# Patient Record
Sex: FEMALE | Race: WHITE | NOT HISPANIC OR LATINO | Employment: OTHER | URBAN - METROPOLITAN AREA
[De-identification: names, ages, dates, MRNs, and addresses within clinical notes are randomized per-mention and may not be internally consistent; named-entity substitution may affect disease eponyms.]

---

## 2017-04-12 ENCOUNTER — IMPORTED ENCOUNTER (OUTPATIENT)
Dept: URBAN - METROPOLITAN AREA CLINIC 9 | Facility: CLINIC | Age: 42
End: 2017-04-12

## 2020-01-14 NOTE — PATIENT DISCUSSION
EXPLAINED TO PATIENT SHE IS NOT SEEING AS WELL AS SHE THINKS AS CATARACTS ARE A SLOW GRADUAL PROGRESSION

## 2020-01-14 NOTE — PATIENT DISCUSSION
CATARACT, OU - EQUALLY VISUALLY SIGNIFICANT . SCHEDULE SX OD (DOMINATE EYE) THEN LATER IN OS IF VISUAL SYMPTOMS PERSIST.

## 2020-03-03 NOTE — PATIENT DISCUSSION
S/P PC IOL, OU-  DOING WELL. CONTINUE DROPS AS DIRECTED. GLS RX GIVEN. RELEASED TO REFERRING PROVIDER. RTC PRN.

## 2021-10-01 ASSESSMENT — TONOMETRY
OD_IOP_MMHG: 13
OS_IOP_MMHG: 12

## 2021-10-22 ENCOUNTER — ESTABLISHED PATIENT (OUTPATIENT)
Dept: URBAN - METROPOLITAN AREA CLINIC 9 | Facility: CLINIC | Age: 46
End: 2021-10-22

## 2021-10-22 DIAGNOSIS — H52.13: ICD-10-CM

## 2021-10-22 DIAGNOSIS — H01.02A: ICD-10-CM

## 2021-10-22 DIAGNOSIS — H01.02B: ICD-10-CM

## 2021-10-22 DIAGNOSIS — H04.123: ICD-10-CM

## 2021-10-22 DIAGNOSIS — Z79.899: ICD-10-CM

## 2021-10-22 PROCEDURE — 92004 COMPRE OPH EXAM NEW PT 1/>: CPT

## 2021-10-22 PROCEDURE — 92015 DETERMINE REFRACTIVE STATE: CPT

## 2021-10-22 PROCEDURE — 92134 CPTRZ OPH DX IMG PST SGM RTA: CPT

## 2021-10-22 ASSESSMENT — KERATOMETRY
OS_AXISANGLE2_DEGREES: 92
OS_K1POWER_DIOPTERS: 44.75
OS_K2POWER_DIOPTERS: 45.75
OD_AXISANGLE2_DEGREES: 70
OD_K2POWER_DIOPTERS: 45.75
OD_K1POWER_DIOPTERS: 45.00
OS_AXISANGLE_DEGREES: 2
OD_AXISANGLE_DEGREES: 160

## 2021-10-22 ASSESSMENT — VISUAL ACUITY
OD_CC: 20/20-2
OU_CC: 20/20
OU_SC: 20/400
OS_CC: 20/20-1
OS_SC: CF 2FT
OD_SC: CF 2FT

## 2021-10-22 ASSESSMENT — TONOMETRY
OD_IOP_MMHG: 14
OS_IOP_MMHG: 15

## 2022-07-05 RX ORDER — TAMOXIFEN CITRATE 20 MG/1
TABLET ORAL
COMMUNITY

## 2022-07-05 RX ORDER — AMLODIPINE BESYLATE 5 MG/1
TABLET ORAL
COMMUNITY

## 2023-09-11 ENCOUNTER — ESTABLISHED PATIENT (OUTPATIENT)
Dept: URBAN - METROPOLITAN AREA CLINIC 9 | Facility: CLINIC | Age: 48
End: 2023-09-11

## 2023-09-11 DIAGNOSIS — H43.823: ICD-10-CM

## 2023-09-11 DIAGNOSIS — H04.123: ICD-10-CM

## 2023-09-11 DIAGNOSIS — L71.9: ICD-10-CM

## 2023-09-11 DIAGNOSIS — H52.13: ICD-10-CM

## 2023-09-11 PROCEDURE — 92134 CPTRZ OPH DX IMG PST SGM RTA: CPT

## 2023-09-11 PROCEDURE — 92015 DETERMINE REFRACTIVE STATE: CPT

## 2023-09-11 PROCEDURE — 92014 COMPRE OPH EXAM EST PT 1/>: CPT

## 2023-09-11 ASSESSMENT — VISUAL ACUITY
OD_PH: 20/60+1
OS_SC: 20/200
OD_SC: 20/200
OS_PH: 20/60+1

## 2023-09-11 ASSESSMENT — TONOMETRY
OS_IOP_MMHG: 16
OD_IOP_MMHG: 14

## 2024-09-13 ENCOUNTER — COMPREHENSIVE EXAM (OUTPATIENT)
Facility: LOCATION | Age: 49
End: 2024-09-13

## 2024-09-13 DIAGNOSIS — H04.123: ICD-10-CM

## 2024-09-13 DIAGNOSIS — L71.9: ICD-10-CM

## 2024-09-13 DIAGNOSIS — H52.13: ICD-10-CM

## 2024-09-13 DIAGNOSIS — H43.823: ICD-10-CM

## 2024-09-13 PROCEDURE — 92015 DETERMINE REFRACTIVE STATE: CPT

## 2024-09-13 PROCEDURE — 92014 COMPRE OPH EXAM EST PT 1/>: CPT

## 2025-04-08 ENCOUNTER — EMERGENCY VISIT (OUTPATIENT)
Age: 50
End: 2025-04-08

## 2025-04-08 DIAGNOSIS — L71.9: ICD-10-CM

## 2025-04-08 DIAGNOSIS — H43.823: ICD-10-CM

## 2025-04-08 DIAGNOSIS — H52.13: ICD-10-CM

## 2025-04-08 DIAGNOSIS — H00.12: ICD-10-CM

## 2025-04-08 DIAGNOSIS — H04.123: ICD-10-CM

## 2025-04-08 PROCEDURE — 99214 OFFICE O/P EST MOD 30 MIN: CPT

## 2025-04-08 PROCEDURE — 11900 INJECT SKIN LESIONS </W 7: CPT | Mod: 25,RT
